# Patient Record
Sex: MALE | Race: BLACK OR AFRICAN AMERICAN | NOT HISPANIC OR LATINO | Employment: UNEMPLOYED | URBAN - METROPOLITAN AREA
[De-identification: names, ages, dates, MRNs, and addresses within clinical notes are randomized per-mention and may not be internally consistent; named-entity substitution may affect disease eponyms.]

---

## 2024-02-08 ENCOUNTER — APPOINTMENT (OUTPATIENT)
Dept: CARDIOLOGY | Facility: HOSPITAL | Age: 44
End: 2024-02-08
Payer: COMMERCIAL

## 2024-02-13 PROBLEM — I26.99 PULMONARY EMBOLISM (MULTI): Status: ACTIVE | Noted: 2024-02-13

## 2024-02-13 PROBLEM — I82.409 DVT OF LEG (DEEP VENOUS THROMBOSIS) (MULTI): Status: ACTIVE | Noted: 2024-02-13

## 2024-04-10 ENCOUNTER — HOSPITAL ENCOUNTER (EMERGENCY)
Facility: HOSPITAL | Age: 44
Discharge: HOME | End: 2024-04-10
Attending: EMERGENCY MEDICINE
Payer: COMMERCIAL

## 2024-04-10 ENCOUNTER — APPOINTMENT (OUTPATIENT)
Dept: RADIOLOGY | Facility: HOSPITAL | Age: 44
End: 2024-04-10
Payer: COMMERCIAL

## 2024-04-10 VITALS
WEIGHT: 270 LBS | TEMPERATURE: 97.7 F | HEART RATE: 100 BPM | SYSTOLIC BLOOD PRESSURE: 134 MMHG | RESPIRATION RATE: 16 BRPM | OXYGEN SATURATION: 98 % | HEIGHT: 69 IN | BODY MASS INDEX: 39.99 KG/M2 | DIASTOLIC BLOOD PRESSURE: 93 MMHG
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
## 2024-04-10 PROCEDURE — 73630 X-RAY EXAM OF FOOT: CPT | Mod: RIGHT SIDE | Performed by: RADIOLOGY

## 2024-04-10 PROCEDURE — 96372 THER/PROPH/DIAG INJ SC/IM: CPT | Performed by: EMERGENCY MEDICINE

## 2024-04-10 PROCEDURE — 90375 RABIES IG IM/SC: CPT | Mod: JW,JG | Performed by: EMERGENCY MEDICINE

## 2024-04-10 PROCEDURE — 73630 X-RAY EXAM OF FOOT: CPT | Mod: RT,76

## 2024-04-10 PROCEDURE — 2500000001 HC RX 250 WO HCPCS SELF ADMINISTERED DRUGS (ALT 637 FOR MEDICARE OP): Performed by: EMERGENCY MEDICINE

## 2024-04-10 PROCEDURE — 90472 IMMUNIZATION ADMIN EACH ADD: CPT | Performed by: EMERGENCY MEDICINE

## 2024-04-10 PROCEDURE — 99284 EMERGENCY DEPT VISIT MOD MDM: CPT | Performed by: EMERGENCY MEDICINE

## 2024-04-10 PROCEDURE — 99283 EMERGENCY DEPT VISIT LOW MDM: CPT | Mod: 25

## 2024-04-10 PROCEDURE — 2500000004 HC RX 250 GENERAL PHARMACY W/ HCPCS (ALT 636 FOR OP/ED): Mod: JW,JG | Performed by: EMERGENCY MEDICINE

## 2024-04-10 PROCEDURE — 90471 IMMUNIZATION ADMIN: CPT | Performed by: EMERGENCY MEDICINE

## 2024-04-10 PROCEDURE — 90715 TDAP VACCINE 7 YRS/> IM: CPT | Performed by: EMERGENCY MEDICINE

## 2024-04-10 PROCEDURE — 73630 X-RAY EXAM OF FOOT: CPT | Mod: RT

## 2024-04-10 PROCEDURE — 90675 RABIES VACCINE IM: CPT | Mod: JZ,JG | Performed by: EMERGENCY MEDICINE

## 2024-04-10 RX ORDER — METRONIDAZOLE 500 MG/1
500 TABLET ORAL 3 TIMES DAILY
Qty: 30 TABLET | Refills: 0 | Status: SHIPPED | OUTPATIENT
Start: 2024-04-10 | End: 2024-04-24

## 2024-04-10 RX ORDER — DEXTROSE MONOHYDRATE 100 MG/ML
50 INJECTION, SOLUTION INTRAVENOUS CONTINUOUS
Status: DISCONTINUED | OUTPATIENT
Start: 2024-04-10 | End: 2024-04-10

## 2024-04-10 RX ORDER — CEPHALEXIN 250 MG/1
500 CAPSULE ORAL ONCE
Status: COMPLETED | OUTPATIENT
Start: 2024-04-10 | End: 2024-04-10

## 2024-04-10 RX ORDER — OXYCODONE HYDROCHLORIDE 5 MG/1
5 TABLET ORAL ONCE
Status: COMPLETED | OUTPATIENT
Start: 2024-04-10 | End: 2024-04-10

## 2024-04-10 RX ORDER — METRONIDAZOLE 500 MG/1
500 TABLET ORAL ONCE
Status: COMPLETED | OUTPATIENT
Start: 2024-04-10 | End: 2024-04-10

## 2024-04-10 RX ORDER — CEPHALEXIN 500 MG/1
500 CAPSULE ORAL 4 TIMES DAILY
Qty: 40 CAPSULE | Refills: 0 | Status: SHIPPED | OUTPATIENT
Start: 2024-04-10 | End: 2024-04-24

## 2024-04-10 RX ORDER — OXYCODONE HYDROCHLORIDE 5 MG/1
5 TABLET ORAL EVERY 6 HOURS PRN
Qty: 15 TABLET | Refills: 0 | Status: SHIPPED | OUTPATIENT
Start: 2024-04-10 | End: 2024-04-14

## 2024-04-10 RX ADMIN — CEPHALEXIN 500 MG: 250 CAPSULE ORAL at 12:55

## 2024-04-10 RX ADMIN — OXYCODONE HYDROCHLORIDE 5 MG: 5 TABLET ORAL at 16:43

## 2024-04-10 RX ADMIN — RABIES IMMUNE GLOBULIN (HUMAN) 2430 UNITS: 300 INJECTION, SOLUTION INFILTRATION; INTRAMUSCULAR at 13:10

## 2024-04-10 RX ADMIN — OXYCODONE HYDROCHLORIDE 5 MG: 5 TABLET ORAL at 12:55

## 2024-04-10 RX ADMIN — RABIES VACCINE 1 ML: KIT at 13:45

## 2024-04-10 RX ADMIN — METRONIDAZOLE 500 MG: 500 TABLET ORAL at 12:55

## 2024-04-10 RX ADMIN — TETANUS TOXOID, REDUCED DIPHTHERIA TOXOID AND ACELLULAR PERTUSSIS VACCINE, ADSORBED 0.5 ML: 5; 2.5; 8; 8; 2.5 SUSPENSION INTRAMUSCULAR at 13:00

## 2024-04-10 ASSESSMENT — COLUMBIA-SUICIDE SEVERITY RATING SCALE - C-SSRS
6. HAVE YOU EVER DONE ANYTHING, STARTED TO DO ANYTHING, OR PREPARED TO DO ANYTHING TO END YOUR LIFE?: NO
1. IN THE PAST MONTH, HAVE YOU WISHED YOU WERE DEAD OR WISHED YOU COULD GO TO SLEEP AND NOT WAKE UP?: NO
2. HAVE YOU ACTUALLY HAD ANY THOUGHTS OF KILLING YOURSELF?: NO

## 2024-04-10 NOTE — ED PROVIDER NOTES
[facility] Emergency Medicine   Date:  4/10/2024  Patient:  Maxi Padilla  YOB: 1980  MRN:  [id number]   ED Provider: Rinku Wilson DO    History of Present Illness:  Patient is a 43-year-old gentleman presenting to the emergency room today after sustaining a dog bite to his right foot.  Patient was bit by a pitbull.  He is unsure if his dog is.  Unclear of rabies vaccination status.  Does take Xarelto, but did not take this morning.  Came to the emergency room immediately after sustained bite for evaluation.  He reports significant pain and swelling of his foot.  Sustained a laceration to the dorsum of his foot as well as the underside.  Bleeding has been controlled.  Unclear of his last tetanus shot.  No other complaints on my assessment.    Limitations to history: N/A  Independent Historians: N/A  External Records Reviewed: N/A    Past Medical History     History reviewed. No pertinent past medical history.  History reviewed. No pertinent surgical history.     Allergies   Allergen Reactions    Amoxicillin Itching    Hydromorphone Hives and Itching     Current Outpatient Medications   Medication Instructions    cephalexin (KEFLEX) 500 mg, oral, 4 times daily    metroNIDAZOLE (FLAGYL) 500 mg, oral, 3 times daily    oxyCODONE (ROXICODONE) 5 mg, oral, Every 6 hours PRN    Xarelto 20 mg, oral, Daily with evening meal       Review of Systems:     ROS: a ten point review of systems was performed and was negative except as per HPI.    Physical Exam:    Vitals:    04/10/24 1129   BP: (!) 134/93   Pulse: 100   Resp: 16   Temp: 36.5 °C (97.7 °F)   SpO2: 98%       Physical Exam  Constitutional:       Appearance: Normal appearance.   HENT:      Head: Normocephalic and atraumatic.      Right Ear: Tympanic membrane normal.      Left Ear: Tympanic membrane normal.      Nose: Nose normal.      Mouth/Throat:      Mouth: Mucous membranes are moist.      Pharynx: No oropharyngeal  exudate or posterior oropharyngeal erythema.   Eyes:      Extraocular Movements: Extraocular movements intact.      Conjunctiva/sclera: Conjunctivae normal.   Cardiovascular:      Rate and Rhythm: Normal rate and regular rhythm.   Pulmonary:      Effort: Pulmonary effort is normal. No respiratory distress.   Abdominal:      General: There is no distension.      Palpations: Abdomen is soft.   Musculoskeletal:         General: No swelling or deformity. Normal range of motion.      Cervical back: Normal range of motion.      Comments: Right foot with a 4 cm laceration to the dorsum of the foot, approximately half centimeter wide, with a small half a centimeter puncture wound to the medial aspect.  Tendons appear intact, no loss of sensation, palpable pulse, capillary refill less than 2 seconds, all toes able to be moved.   Skin:     General: Skin is warm and dry.   Neurological:      General: No focal deficit present.      Mental Status: He is alert and oriented to person, place, and time.      Gait: Gait normal.   Psychiatric:         Mood and Affect: Mood normal.           Diagnostics:    Labs Reviewed - No data to display    XR foot right 3+ views    (Results Pending)       Medications   oxyCODONE (Roxicodone) immediate release tablet 5 mg (has no administration in time range)   cephalexin (Keflex) capsule 500 mg (500 mg oral Given 4/10/24 1255)   metroNIDAZOLE (Flagyl) tablet 500 mg (500 mg oral Given 4/10/24 1255)   rabies immune globulin (PF) (HypeRAB) injection 2,430 Units (2,430 Units infiltration Given 4/10/24 1310)   oxyCODONE (Roxicodone) immediate release tablet 5 mg (5 mg oral Given 4/10/24 1255)   diphth,pertus(acell),tetanus (BoostRIX) 2.5-8-5 Lf-mcg-Lf/0.5mL vaccine 0.5 mL (0.5 mL intramuscular Given 4/10/24 1300)   rabies vaccine (from purified chicken embryo cells) (RabAvert) 2.5 unit immunization 1 mL (1 mL intramuscular Given 4/10/24 1345)         Hospital Course / Medical Decision  Making:    Diagnoses as of 04/10/24 1643   Dog bite, initial encounter       Patient is a 43-year-old gentleman presenting after a dog bite.  Patient seen on his arrival to the department.  Vital signs reviewed.  As the dog is unknown the patient will require rabies immunoglobulin as well as the beginning of his rabies vaccination series.  I impressed upon the patient the importance of completing the vaccination series as well as immunoglobulin as rabies is universally fatal and we do not know the vaccination status of the dog.  The wounds were washed out with approximately 500 cc of sterile solution, there were no tendon deficits noted, no arterial bleeding, no pulse deficit.  Tetanus was updated.  Patient was given Keflex/Flagyl (he has a amoxicillin allergy so cannot give Augmentin) as well as his first rabies vaccination.  Oral pain medications provided.  X-ray did not reveal fracture.  Given the location of the wound as well as that is a dirty wound recommendations are to not suture closed.  Discussed with the patient local wound care.  He should return to the emergency room on day 3, 7, 14 for repeat evaluation of his wound as well as completion of his rabies vaccination schedule.        Diagnosis  Dog Bite    Disposition: [place]    Critical Care Time: KATY Wilson DO  EM Attending      Rinku Wilson DO  04/10/24 5434

## 2024-04-10 NOTE — PROGRESS NOTES
I received Maxi Padilla in signout from Dr. Wilson.  Please see the previous note for all HPI, PE and MDM up to the time of signout at 1700.    In brief Maxi Padilla is an 43 y.o. male presenting for   Chief Complaint   Patient presents with    Animal Bite   .  At the time of signout we were awaiting:  completion of his foot xray.  This was negative.  He was provided w/ abx and given precautions on rabies follow-up and strict return precautions including any worsening redness, swelling, drainage, fever greater than 100.4 Fahrenheit.        Pt Disposition: Discharge    Procedures

## 2024-05-22 ENCOUNTER — OFFICE VISIT (OUTPATIENT)
Dept: CARDIOLOGY | Facility: CLINIC | Age: 44
End: 2024-05-22
Payer: COMMERCIAL

## 2024-05-22 VITALS
HEART RATE: 94 BPM | DIASTOLIC BLOOD PRESSURE: 93 MMHG | HEIGHT: 69 IN | WEIGHT: 293.13 LBS | OXYGEN SATURATION: 93 % | SYSTOLIC BLOOD PRESSURE: 144 MMHG | BODY MASS INDEX: 43.42 KG/M2

## 2024-05-22 DIAGNOSIS — Z72.0 TOBACCO ABUSE: ICD-10-CM

## 2024-05-22 DIAGNOSIS — R55 SYNCOPE, UNSPECIFIED SYNCOPE TYPE: ICD-10-CM

## 2024-05-22 DIAGNOSIS — R06.02 SHORTNESS OF BREATH: ICD-10-CM

## 2024-05-22 DIAGNOSIS — I26.99 PULMONARY EMBOLISM, UNSPECIFIED CHRONICITY, UNSPECIFIED PULMONARY EMBOLISM TYPE, UNSPECIFIED WHETHER ACUTE COR PULMONALE PRESENT (MULTI): Primary | ICD-10-CM

## 2024-05-22 PROCEDURE — 99214 OFFICE O/P EST MOD 30 MIN: CPT | Performed by: INTERNAL MEDICINE

## 2024-05-22 PROCEDURE — 93005 ELECTROCARDIOGRAM TRACING: CPT | Performed by: INTERNAL MEDICINE

## 2024-05-22 RX ORDER — RIVAROXABAN 20 MG/1
20 TABLET, FILM COATED ORAL
Qty: 90 TABLET | Refills: 3 | Status: SHIPPED | OUTPATIENT
Start: 2024-05-22

## 2024-05-22 ASSESSMENT — PATIENT HEALTH QUESTIONNAIRE - PHQ9
1. LITTLE INTEREST OR PLEASURE IN DOING THINGS: NOT AT ALL
2. FEELING DOWN, DEPRESSED OR HOPELESS: NOT AT ALL
SUM OF ALL RESPONSES TO PHQ9 QUESTIONS 1 AND 2: 0

## 2024-05-22 ASSESSMENT — ENCOUNTER SYMPTOMS
OCCASIONAL FEELINGS OF UNSTEADINESS: 1
LOSS OF SENSATION IN FEET: 1
DEPRESSION: 0

## 2024-05-22 ASSESSMENT — PAIN SCALES - GENERAL: PAINLEVEL: 10-WORST PAIN EVER

## 2024-05-22 ASSESSMENT — COLUMBIA-SUICIDE SEVERITY RATING SCALE - C-SSRS
6. HAVE YOU EVER DONE ANYTHING, STARTED TO DO ANYTHING, OR PREPARED TO DO ANYTHING TO END YOUR LIFE?: NO
2. HAVE YOU ACTUALLY HAD ANY THOUGHTS OF KILLING YOURSELF?: NO
1. IN THE PAST MONTH, HAVE YOU WISHED YOU WERE DEAD OR WISHED YOU COULD GO TO SLEEP AND NOT WAKE UP?: NO

## 2024-05-22 NOTE — PROGRESS NOTES
Subjective   Maxi Padilla is a 43 y.o. male.    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  History of DVT in 2013 in 2017    Tobacco: Smokes about 10-20 cigarettes a day    Social history: Lives with his wife and 6 children.  Works as a     Drugs: None        Current Outpatient Medications   Medication Instructions    Xarelto 20 mg, oral, Daily with evening meal        Chief Complaint:  New Patient Visit (Pulmonary Embolism/DVT)    HPI  Patient has a very poor compliance history with Xarelto and he has not been on Xarelto for the past 4 months.    Patient also tells me 6 months ago he passed out about 3 times.  2 times it happened while he was at work and 1 time it happened while he was walking.  All 3 times he felt some dizziness prior to syncope.  No significant injury suffered during the syncope episodes.  Patient woke up without any significant problems and never visited the hospital for the syncope episodes    Patient also complains of dyspnea on exertion over the past 1 year and some episodic shortness of breath over the same time which comes and goes.  Patient feels that over the past year he is not able to work as much as he could before.  Usually he will get short of breath and has to switch to some other lighter job while at work.    No swelling in the legs..  Recently had a dog bite in the right foot which is swollen because of that.    Denies any chest pain.  Denies palpitations.    ROS  -Some issues with right foot swelling and laceration after dog bite.    Objective   Constitutional:       Appearance: Not in distress.   Neck:      Vascular: JVD normal.   Pulmonary:      Effort: Pulmonary effort is normal.      Breath sounds: Normal breath sounds.   Cardiovascular:      PMI at left midclavicular line. Normal rate. Regular rhythm. Normal S1. Normal S2.       Murmurs: There is no murmur.   Edema:     Peripheral edema absent.   Abdominal:      General:  "Bowel sounds are normal.      Palpations: Abdomen is soft.   Skin:     General: Skin is warm and dry.   Neurological:      General: No focal deficit present.      Mental Status: Alert and oriented to person, place and time.       Vitals:    05/22/24 1347   BP: (!) 144/93   Pulse: 94   SpO2:          Lab Review:   Lab Results   Component Value Date     01/20/2020    K 4.4 01/20/2020     (H) 01/20/2020    CO2 24 01/20/2020    BUN 15 01/20/2020    CREATININE 1.06 01/20/2020    GLUCOSE 85 01/20/2020    CALCIUM 8.8 01/20/2020     Lab Results   Component Value Date    WBC 7.1 01/20/2020    HGB 13.9 01/20/2020    HCT 39.9 (L) 01/20/2020    MCV 89 01/20/2020     01/20/2020     No results found for: \"CHOL\", \"TRIG\", \"HDL\", \"LDLDIRECT\"      TTE  Jan 2020  CONCLUSIONS:   1. The left ventricular systolic function is normal with a 55-60% estimated ejection fraction.   2. Slightly elevated RVSP.   3. The pulmonary artery is not well visualized.    CT PE Jan 2020  IMPRESSION:  Saddle embolism with extensive bilateral pulmonary emboli and findings  suggestive of right heart strain.        Assessment/Plan   The encounter diagnosis was Pulmonary embolism, unspecified chronicity, unspecified pulmonary embolism type, unspecified whether acute cor pulmonale present (Multi).    43-year-old man with recurrent history of DVT and saddle pulmonary embolism in 2020 is here for a follow-up visit because of multiple symptoms like shortness of breath specifically with exertion over the past 12 months along with 3 episodes syncope about 6 months back with some warning symptoms.  Patient has not been taking Xarelto over the past 4 months.    Syncope episodes: Rare associated with chronic symptoms.  Last episode was about 6 months ago.  I am concerned that they could be related to any new incidence of pulmonary embolism.  Just to rule out any arrhythmias, ordering heart monitor for a month    Shortness of breath:  -Significant " history of thromboembolism along with saddle pulmonary embolus.  Not been compliant with Xarelto, ordering a coronary CT angio to assess for any new pulmonary embolism  -Echocardiogram make sure cardiac function is normal specifically right side and also to assess pulmonary artery pressures      -Resumed Xarelto, renewed with the prescription  -Counseled on the importance of quit smoking.  Patient is motivated and hopefully will stop smoking  -Counseled on the importance of weight loss and eating healthy.    Follow-up in 4 to 6 weeks after the testing started on

## 2024-05-23 LAB
ATRIAL RATE: 92 BPM
P AXIS: 78 DEGREES
P OFFSET: 199 MS
P ONSET: 146 MS
PR INTERVAL: 148 MS
Q ONSET: 220 MS
QRS COUNT: 16 BEATS
QRS DURATION: 80 MS
QT INTERVAL: 340 MS
QTC CALCULATION(BAZETT): 420 MS
QTC FREDERICIA: 392 MS
R AXIS: 55 DEGREES
T AXIS: 17 DEGREES
T OFFSET: 390 MS
VENTRICULAR RATE: 92 BPM

## 2024-06-07 ENCOUNTER — APPOINTMENT (OUTPATIENT)
Dept: CARDIOLOGY | Facility: CLINIC | Age: 44
End: 2024-06-07
Payer: COMMERCIAL